# Patient Record
Sex: FEMALE | Race: WHITE | NOT HISPANIC OR LATINO | URBAN - METROPOLITAN AREA
[De-identification: names, ages, dates, MRNs, and addresses within clinical notes are randomized per-mention and may not be internally consistent; named-entity substitution may affect disease eponyms.]

---

## 2023-06-23 ENCOUNTER — OFFICE (OUTPATIENT)
Dept: URBAN - METROPOLITAN AREA CLINIC 11 | Facility: CLINIC | Age: 58
End: 2023-06-23

## 2023-06-23 VITALS
SYSTOLIC BLOOD PRESSURE: 100 MMHG | DIASTOLIC BLOOD PRESSURE: 66 MMHG | HEART RATE: 60 BPM | OXYGEN SATURATION: 98 % | HEIGHT: 66 IN | WEIGHT: 128 LBS

## 2023-06-23 DIAGNOSIS — R14.0 ABDOMINAL DISTENSION (GASEOUS): ICD-10-CM

## 2023-06-23 DIAGNOSIS — K59.00 CONSTIPATION, UNSPECIFIED: ICD-10-CM

## 2023-06-23 DIAGNOSIS — K21.9 GASTRO-ESOPHAGEAL REFLUX DISEASE WITHOUT ESOPHAGITIS: ICD-10-CM

## 2023-06-23 DIAGNOSIS — R15.0 INCOMPLETE DEFECATION: ICD-10-CM

## 2023-06-23 PROCEDURE — 99204 OFFICE O/P NEW MOD 45 MIN: CPT | Performed by: NURSE PRACTITIONER

## 2023-06-23 RX ORDER — PANTOPRAZOLE 40 MG/1
40 TABLET, DELAYED RELEASE ORAL
Qty: 30 | Refills: 6 | Status: ACTIVE

## 2023-06-23 NOTE — SERVICEHPINOTES
Ms. Grubbs is a 57 year old female here today with complaints of constipation, gas, bloating.  she reports 6 months ago she noticed her stools were a little lighter brown and were thinner at times.  She does have gas, bloating, and occasional urgency.  She has 1 formed bowel movement daily and denies any diarrhea or loose stools.   She has had mucus and a few specks of blood in her stool 2-3 times over the last 6 months.  She denies any medication or diet changes  but did retire almost a year ago and has had some stress. She has a history of GERD and takes pantoprazole but admits to only taking it maybe twice a month.   She does have occasional heartburn.  she denies any nausea, vomiting, abdominal pain.   She had a colonoscopy in 2014 that showed hemorrhoids but was otherwise normal.   She had a negative cologuard in 2022.

## 2023-06-23 NOTE — SERVICENOTES
Stools have become a little lighter and thinner in past 6 months likely from some constipation. will check fecal lactoferrin and start on miralax daily. normal colon 2014 and negative cologuard 2022. she has had  a few spots of blood and mucus in her stool 3 times in the last 6 months.   She did show me a picture and it was extremely minimal.   She does have a history of internal hemorrhoids.   Discussed if this can continued or worsen would consider getting a colonoscopy.   Will see her back in 2 months or sooner needed

## 2023-07-31 LAB — LACTOFERRIN, FECAL, QUANT.: <1 UG/ML(G)

## 2023-08-29 ENCOUNTER — OFFICE (OUTPATIENT)
Dept: URBAN - METROPOLITAN AREA CLINIC 11 | Facility: CLINIC | Age: 58
End: 2023-08-29

## 2023-08-29 VITALS
DIASTOLIC BLOOD PRESSURE: 81 MMHG | SYSTOLIC BLOOD PRESSURE: 147 MMHG | HEART RATE: 47 BPM | OXYGEN SATURATION: 99 % | WEIGHT: 132 LBS | HEIGHT: 66 IN

## 2023-08-29 DIAGNOSIS — R19.5 OTHER FECAL ABNORMALITIES: ICD-10-CM

## 2023-08-29 DIAGNOSIS — K59.00 CONSTIPATION, UNSPECIFIED: ICD-10-CM

## 2023-08-29 DIAGNOSIS — R14.0 ABDOMINAL DISTENSION (GASEOUS): ICD-10-CM

## 2023-08-29 DIAGNOSIS — K21.9 GASTRO-ESOPHAGEAL REFLUX DISEASE WITHOUT ESOPHAGITIS: ICD-10-CM

## 2023-08-29 PROCEDURE — 99214 OFFICE O/P EST MOD 30 MIN: CPT | Performed by: NURSE PRACTITIONER

## 2023-08-29 NOTE — SERVICENOTES
he she has had some are just brown stools for the past year.  You will check the above labs to rule out any liver or pancreatic issues.   Her stools improved slightly with MiraLax but she was not taking every day.  She will start taking every day and incorporate a fiber supplement as well.   She will continue pantoprazole as needed for GERD and will see back in 3 months

## 2023-08-29 NOTE — SERVICEHPINOTES
Ms. Grubbs is a 58 year old female here today for follow up of constipation, gas, bloating.  At her first visit, she had thinner, lighter brown stools, gas, bloating, and occasional urgency.  She had 1 formed bowel movement daily and denies any diarrhea or loose stools.  She had a history of GERD and took pantoprazole but only took it maybe twice a month. She had occasional heartburn. In follow up today, she is doing well. The Miralax has improved her stools some but she was not taking everyday. She is still having orangish-brown stools bur denies any blood in stool. Her GERD continues  to do well on pantoprazole as needed. She denies any nausea, vomiting, abdominal pain.

## 2023-08-30 LAB
AMYLASE: 62 U/L (ref 31–110)
COMP. METABOLIC PANEL (14): A/G RATIO: 1.8 (ref 1.2–2.2)
COMP. METABOLIC PANEL (14): ALBUMIN: 4.4 G/DL (ref 3.8–4.9)
COMP. METABOLIC PANEL (14): ALKALINE PHOSPHATASE: 110 IU/L (ref 44–121)
COMP. METABOLIC PANEL (14): ALT (SGPT): 20 IU/L (ref 0–32)
COMP. METABOLIC PANEL (14): AST (SGOT): 24 IU/L (ref 0–40)
COMP. METABOLIC PANEL (14): BILIRUBIN, TOTAL: 0.4 MG/DL (ref 0–1.2)
COMP. METABOLIC PANEL (14): BUN/CREATININE RATIO: 12 (ref 9–23)
COMP. METABOLIC PANEL (14): BUN: 10 MG/DL (ref 6–24)
COMP. METABOLIC PANEL (14): CALCIUM: 10 MG/DL (ref 8.7–10.2)
COMP. METABOLIC PANEL (14): CARBON DIOXIDE, TOTAL: 21 MMOL/L (ref 20–29)
COMP. METABOLIC PANEL (14): CHLORIDE: 105 MMOL/L (ref 96–106)
COMP. METABOLIC PANEL (14): CREATININE: 0.83 MG/DL (ref 0.57–1)
COMP. METABOLIC PANEL (14): EGFR: 81 ML/MIN/1.73 (ref 59–?)
COMP. METABOLIC PANEL (14): GLOBULIN, TOTAL: 2.5 G/DL (ref 1.5–4.5)
COMP. METABOLIC PANEL (14): GLUCOSE: 86 MG/DL (ref 70–99)
COMP. METABOLIC PANEL (14): POTASSIUM: 4.2 MMOL/L (ref 3.5–5.2)
COMP. METABOLIC PANEL (14): PROTEIN, TOTAL: 6.9 G/DL (ref 6–8.5)
COMP. METABOLIC PANEL (14): SODIUM: 143 MMOL/L (ref 134–144)
LIPASE: 36 U/L (ref 14–72)

## 2023-11-29 ENCOUNTER — OFFICE (OUTPATIENT)
Dept: URBAN - METROPOLITAN AREA CLINIC 11 | Facility: CLINIC | Age: 58
End: 2023-11-29

## 2023-11-29 VITALS
HEIGHT: 66 IN | SYSTOLIC BLOOD PRESSURE: 135 MMHG | WEIGHT: 134 LBS | OXYGEN SATURATION: 98 % | HEART RATE: 60 BPM | DIASTOLIC BLOOD PRESSURE: 89 MMHG

## 2023-11-29 DIAGNOSIS — K59.00 CONSTIPATION, UNSPECIFIED: ICD-10-CM

## 2023-11-29 DIAGNOSIS — R14.0 ABDOMINAL DISTENSION (GASEOUS): ICD-10-CM

## 2023-11-29 DIAGNOSIS — R14.3 FLATULENCE: ICD-10-CM

## 2023-11-29 DIAGNOSIS — K21.9 GASTRO-ESOPHAGEAL REFLUX DISEASE WITHOUT ESOPHAGITIS: ICD-10-CM

## 2023-11-29 PROCEDURE — 99214 OFFICE O/P EST MOD 30 MIN: CPT | Performed by: NURSE PRACTITIONER

## 2023-11-29 RX ORDER — PANTOPRAZOLE 40 MG/1
40 TABLET, DELAYED RELEASE ORAL
Qty: 30 | Refills: 6 | Status: ACTIVE

## 2023-11-29 NOTE — SERVICENOTES
she will continue pantoprazole as needed for her GERD. She will start taking miralax daily.   She is due for colonoscopy in May 2024.  Will see back in 1 year sooner if needed

## 2023-11-29 NOTE — SERVICEHPINOTES
Ms. Grubbs is a 58 year old female here today for follow up of constipation, gas, bloating.  Initially, she had thinner, lighter brown stools, gas, bloating, and occasional urgency.  She had 1 formed bowel movement daily.  Miralax improved her stools some but she was not taking everyday. She was having orangish-brown stools and had normal LFTs, pancreatic enzymes.  She had a history of GERD and took pantoprazole but only took it maybe twice a month. In follow up today, she is doing well and has no major complaints or concerns. She continues taking Miralax a few times a week and her stools are doing fairly well. She thinks her previous stool color change was from something she was eating pretty regularly. She denies any n/v or abdominal pain.